# Patient Record
Sex: MALE | Race: OTHER | NOT HISPANIC OR LATINO
[De-identification: names, ages, dates, MRNs, and addresses within clinical notes are randomized per-mention and may not be internally consistent; named-entity substitution may affect disease eponyms.]

---

## 2017-05-15 ENCOUNTER — MEDICATION RENEWAL (OUTPATIENT)
Age: 62
End: 2017-05-15

## 2017-06-28 ENCOUNTER — MEDICATION RENEWAL (OUTPATIENT)
Age: 62
End: 2017-06-28

## 2017-06-29 ENCOUNTER — MEDICATION RENEWAL (OUTPATIENT)
Age: 62
End: 2017-06-29

## 2017-10-02 ENCOUNTER — APPOINTMENT (OUTPATIENT)
Age: 62
End: 2017-10-02
Payer: COMMERCIAL

## 2017-10-02 VITALS
TEMPERATURE: 97.7 F | RESPIRATION RATE: 16 BRPM | HEART RATE: 80 BPM | HEIGHT: 64 IN | SYSTOLIC BLOOD PRESSURE: 122 MMHG | DIASTOLIC BLOOD PRESSURE: 80 MMHG

## 2017-10-02 VITALS — WEIGHT: 173 LBS | BODY MASS INDEX: 29.7 KG/M2

## 2017-10-02 PROCEDURE — 99214 OFFICE O/P EST MOD 30 MIN: CPT

## 2018-03-09 ENCOUNTER — MEDICATION RENEWAL (OUTPATIENT)
Age: 63
End: 2018-03-09

## 2018-03-20 ENCOUNTER — MEDICATION RENEWAL (OUTPATIENT)
Age: 63
End: 2018-03-20

## 2018-04-04 ENCOUNTER — APPOINTMENT (OUTPATIENT)
Age: 63
End: 2018-04-04
Payer: COMMERCIAL

## 2018-04-04 VITALS
HEIGHT: 64 IN | SYSTOLIC BLOOD PRESSURE: 165 MMHG | DIASTOLIC BLOOD PRESSURE: 89 MMHG | WEIGHT: 168 LBS | BODY MASS INDEX: 28.68 KG/M2 | HEART RATE: 73 BPM | TEMPERATURE: 98.4 F | RESPIRATION RATE: 12 BRPM

## 2018-04-04 PROCEDURE — 99214 OFFICE O/P EST MOD 30 MIN: CPT

## 2018-08-13 ENCOUNTER — OTHER (OUTPATIENT)
Age: 63
End: 2018-08-13

## 2018-10-11 ENCOUNTER — APPOINTMENT (OUTPATIENT)
Dept: HEPATOLOGY | Facility: CLINIC | Age: 63
End: 2018-10-11
Payer: COMMERCIAL

## 2018-10-11 VITALS
DIASTOLIC BLOOD PRESSURE: 85 MMHG | SYSTOLIC BLOOD PRESSURE: 154 MMHG | TEMPERATURE: 97.7 F | BODY MASS INDEX: 28 KG/M2 | RESPIRATION RATE: 12 BRPM | HEIGHT: 64 IN | HEART RATE: 62 BPM | WEIGHT: 164 LBS

## 2018-10-11 PROCEDURE — 99214 OFFICE O/P EST MOD 30 MIN: CPT

## 2019-02-12 ENCOUNTER — APPOINTMENT (OUTPATIENT)
Dept: HEPATOLOGY | Facility: CLINIC | Age: 64
End: 2019-02-12
Payer: COMMERCIAL

## 2019-02-12 PROCEDURE — 91200 LIVER ELASTOGRAPHY: CPT

## 2019-04-26 ENCOUNTER — MEDICATION RENEWAL (OUTPATIENT)
Age: 64
End: 2019-04-26

## 2019-05-09 ENCOUNTER — APPOINTMENT (OUTPATIENT)
Dept: HEPATOLOGY | Facility: CLINIC | Age: 64
End: 2019-05-09
Payer: COMMERCIAL

## 2019-05-09 VITALS
BODY MASS INDEX: 27.83 KG/M2 | DIASTOLIC BLOOD PRESSURE: 87 MMHG | HEART RATE: 58 BPM | WEIGHT: 163 LBS | HEIGHT: 64 IN | RESPIRATION RATE: 12 BRPM | TEMPERATURE: 97.8 F | SYSTOLIC BLOOD PRESSURE: 149 MMHG

## 2019-05-09 LAB
AFP-TM SERPL-MCNC: 2.1 NG/ML
ALBUMIN SERPL ELPH-MCNC: 4 G/DL
ALP BLD-CCNC: 92 U/L
ALT SERPL-CCNC: 19 U/L
ANION GAP SERPL CALC-SCNC: 15 MMOL/L
AST SERPL-CCNC: 15 U/L
BASOPHILS # BLD AUTO: 0.04 K/UL
BASOPHILS NFR BLD AUTO: 0.5 %
BILIRUB SERPL-MCNC: 0.7 MG/DL
BUN SERPL-MCNC: 22 MG/DL
CALCIUM SERPL-MCNC: 9.3 MG/DL
CHLORIDE SERPL-SCNC: 103 MMOL/L
CO2 SERPL-SCNC: 22 MMOL/L
CREAT SERPL-MCNC: 1.31 MG/DL
EOSINOPHIL # BLD AUTO: 0.16 K/UL
EOSINOPHIL NFR BLD AUTO: 2.1 %
HCT VFR BLD CALC: 50.1 %
HGB BLD-MCNC: 16.2 G/DL
IMM GRANULOCYTES NFR BLD AUTO: 0.5 %
LYMPHOCYTES # BLD AUTO: 2.83 K/UL
LYMPHOCYTES NFR BLD AUTO: 37.1 %
MAN DIFF?: NORMAL
MCHC RBC-ENTMCNC: 30.3 PG
MCHC RBC-ENTMCNC: 32.3 GM/DL
MCV RBC AUTO: 93.6 FL
MONOCYTES # BLD AUTO: 0.84 K/UL
MONOCYTES NFR BLD AUTO: 11 %
NEUTROPHILS # BLD AUTO: 3.72 K/UL
NEUTROPHILS NFR BLD AUTO: 48.8 %
PLATELET # BLD AUTO: 300 K/UL
POTASSIUM SERPL-SCNC: 4.9 MMOL/L
PROT SERPL-MCNC: 6.9 G/DL
RBC # BLD: 5.35 M/UL
RBC # FLD: 13.3 %
SODIUM SERPL-SCNC: 140 MMOL/L
WBC # FLD AUTO: 7.63 K/UL

## 2019-05-09 PROCEDURE — 99214 OFFICE O/P EST MOD 30 MIN: CPT

## 2019-05-10 LAB
HBV CORE IGG+IGM SER QL: REACTIVE
HBV SURFACE AB SER QL: NONREACTIVE
HBV SURFACE AG SER QL: REACTIVE

## 2019-05-13 LAB
HBV DNA # SERPL NAA+PROBE: NOT DETECTED IU/ML
HEPB DNA PCR LOG: NOT DETECTED LOGIU/ML

## 2019-05-15 LAB
HBV E AB SER QL: POSITIVE
HBV E AG SER QL: NEGATIVE

## 2019-11-27 ENCOUNTER — APPOINTMENT (OUTPATIENT)
Dept: HEPATOLOGY | Facility: CLINIC | Age: 64
End: 2019-11-27
Payer: COMMERCIAL

## 2019-11-27 VITALS
HEIGHT: 64 IN | RESPIRATION RATE: 12 BRPM | HEART RATE: 80 BPM | WEIGHT: 166 LBS | DIASTOLIC BLOOD PRESSURE: 77 MMHG | SYSTOLIC BLOOD PRESSURE: 151 MMHG | BODY MASS INDEX: 28.34 KG/M2 | TEMPERATURE: 98.9 F

## 2019-11-27 PROCEDURE — 99214 OFFICE O/P EST MOD 30 MIN: CPT

## 2020-08-06 ENCOUNTER — APPOINTMENT (OUTPATIENT)
Dept: HEPATOLOGY | Facility: CLINIC | Age: 65
End: 2020-08-06

## 2020-08-21 ENCOUNTER — APPOINTMENT (OUTPATIENT)
Dept: HEPATOLOGY | Facility: CLINIC | Age: 65
End: 2020-08-21
Payer: COMMERCIAL

## 2020-08-21 VITALS
HEART RATE: 60 BPM | TEMPERATURE: 97.9 F | BODY MASS INDEX: 27.49 KG/M2 | DIASTOLIC BLOOD PRESSURE: 85 MMHG | SYSTOLIC BLOOD PRESSURE: 125 MMHG | RESPIRATION RATE: 12 BRPM | HEIGHT: 64 IN | WEIGHT: 161 LBS

## 2020-08-21 PROCEDURE — 91200 LIVER ELASTOGRAPHY: CPT

## 2020-08-21 PROCEDURE — 99214 OFFICE O/P EST MOD 30 MIN: CPT

## 2020-08-21 RX ORDER — ASPIRIN 81 MG/1
81 TABLET ORAL
Qty: 60 | Refills: 0 | Status: DISCONTINUED | COMMUNITY
Start: 2017-06-01 | End: 2020-08-21

## 2020-08-21 NOTE — HISTORY OF PRESENT ILLNESS
[de-identified] : Mr. NAHOMY FERRO is 65 year old male who presents for the follow up appointment with H/O hepatitis B. He had been on had Viread which was switched to Baraclude on April 12, 2012. He has no complaints, feels well. \par \par Fibroscan 08/21/20 S3 (288) and F0-1 (4.4) disease.\par February 12, 2019 with F0, S1 disease\par \par 2020 [facility] Labs AST 16, ALT 19, ALP 89, , AFP 2.8, Hepatitis B Ag Reactive and DNA not detected. US Ab done on 07/31 Normal  results with No focal lesions, No Ascites.   \par \par Blood tests in November 19, 2019 platelet count 235,000, creatinine 1.44, ALT 15, AST 12, total bilirubin 0.8, alpha-fetoprotein 2.6. Abdominal sonogram November 17, 2019 with fatty infiltration of the liver and no lesions noted.\par \par Blood tests March 12, 2019 CBC with platelets unremarkable, ALT 18, AST 14 alpha-fetoprotein 2.6. Abdominal sonogram April 6, 2019 with no lesions in the liver.\par \par Blood tests September 22, 2018 ALT 15, AST 13, total bilirubin 1.5, creatinine 1.11, platelet count 225,000, alpha-fetoprotein 2.5, HBV DNA not detected.\par \par Blood tests January 30, 2018 HBV DNA not detected, ALT 16, ALT 14, E antigen negative. Abdominal sonogram February 17, 2018 shows no lesions in the liver\par \par Blood tests April 30, 2016 ALT 18, AST 16, total bilirubin 1.4, creatinine 1.1, CBC with platelets unremarkable, HBV DNA not detected. Abdominal sonogram April 30, 2016 shows normal lesions in the liver.\par \par Sonogram October 2015 without lesions in the liver. Blood tests September 12, 2015 ALT 22, creatinine 1.29.  HBV DNA June 17, 2015 not detected\par \par An abdominal sonogram from September 13, 2013 to the stable hemangioma in the right lobe of the liver measuring 8 mm. Blood tests on September 13, 2013 show an ALT of 2.3, HBV DNA is less than 24, ALT 19, creatinine 1.26, hemoglobin 17.2.\par \par ALT from February 8, 2013 is 19 with an HBV DNA of less than 20 but detected.\par

## 2020-08-21 NOTE — ASSESSMENT
[FreeTextEntry1] : Mr. NAHOMY FERRO is 65 year old male with H/O Hepatitis B who appears to be doing well.  I have recommended we continue Baraclude.\par \par PLAN to F/U in 6 months with repeat laboratory tests and MRI as suggested by the US Ab results.\par Reviewed the Lab and FS results.\par \par Mr. FERRO was educated about the Fibroscan is a specialized ultrasound machine for your liver. It measures fibrosis (scarring) and steatosis (fatty change) in liver. It does not take the picture of the liver but measures the sheer wave velocity of the sound wave passing through the liver and then converts that into measurement. Hence the follow up for liver imaging is needed in the form of ultrasound or MRI to get a picture of the liver. \par  \par NAHOMY was educated about the fatty liver disease. Reviewed the spectrum of disease, the risk of disease progression to developing cirrhosis and the associated complications. Explained the patient may develop liver cancer without cirrhosis and therefore should be under the yearly surveillance with an abdominal ultrasound. Reviewed the added risk factors commonly seen in patients with diabetes or those who are overweight or vice versa, a precursor to the development of diabetes as it is a component of the metabolic syndrome. Taught back that the best treatment of fatty liver disease is diet and exercise. Discussed the present diet with the patient and recommended the avoidance of fatty foods and to follow a heart healthy diet. Also explained that weight loss may lead to an improvement in the overall underlying liver disease. Taught back the physiology of alcohol abstinence has a important contribution to liver health. \par  \par I discussed at length with the patient regarding hepatitis B. We spoke about the natural history, modes of transmission, diagnostic evaluation and treatment.  I have explained the risk of development of liver cancer and have recommended imaging every 6 months to screen for primary hepatocellular carcinoma. I have discussed at length regarding treatment. I have explained that treatment is likely to be lifelong. I have reviewed parameters for stopping treatment. I have explained that if there is a hepatitis B surface antigen seroconversion with the development of hepatitis B surface antibody, therapy may be discontinued. I have reviewed side effects of therapy.\par \par Encouraged to call back in the interim with any issues or concerns so that we can address and assist as required.

## 2020-08-21 NOTE — PHYSICAL EXAM
[Non-Tender] : non-tender [Cognitive Mini-Mental Status Normal?] : Cognitive Mini Mental Status Exam is normal [General Appearance - Alert] : alert [General Appearance - In No Acute Distress] : in no acute distress [Sclera] : the sclera and conjunctiva were normal [PERRL With Normal Accommodation] : pupils were equal in size, round, and reactive to light [Extraocular Movements] : extraocular movements were intact [Outer Ear] : the ears and nose were normal in appearance [Neck Appearance] : the appearance of the neck was normal [Neck Cervical Mass (___cm)] : no neck mass was observed [Jugular Venous Distention Increased] : there was no jugular-venous distention [Thyroid Diffuse Enlargement] : the thyroid was not enlarged [Thyroid Nodule] : there were no palpable thyroid nodules [Heart Sounds] : normal S1 and S2 [Auscultation Breath Sounds / Voice Sounds] : lungs were clear to auscultation bilaterally [Heart Rate And Rhythm] : heart rate was normal and rhythm regular [Bowel Sounds] : normal bowel sounds [Edema] : there was no peripheral edema [Abdomen Soft] : soft [Abdomen Mass (___ Cm)] : no abdominal mass palpated [Abdomen Tenderness] : non-tender [Abnormal Walk] : normal gait [No CVA Tenderness] : no ~M costovertebral angle tenderness [No Spinal Tenderness] : no spinal tenderness [Musculoskeletal - Swelling] : no joint swelling seen [Nail Clubbing] : no clubbing  or cyanosis of the fingernails [Motor Tone] : muscle strength and tone were normal [Skin Turgor] : normal skin turgor [Skin Color & Pigmentation] : normal skin color and pigmentation [] : no rash [Deep Tendon Reflexes (DTR)] : deep tendon reflexes were 2+ and symmetric [Sensation] : the sensory exam was normal to light touch and pinprick [No Focal Deficits] : no focal deficits [Oriented To Time, Place, And Person] : oriented to person, place, and time [Impaired Insight] : insight and judgment were intact [Affect] : the affect was normal [Scleral Icterus] : No Scleral Icterus [Spider Angioma] : No spider angioma(s) were observed [Hepatojugular Reflux] : patient did not have a sustained hepatojugular reflux [Abdominal Bruit] : no abdominal bruit [Abdominal  Ascites] : no ascites [Splenomegaly] : no splenomegaly [Asterixis] : no asterixis observed [Depression] : no depression [Palmar Erythema] : no Palmar Erythema [Jaundice] : No jaundice [Apical Impulse] : the apical impulse was normal [Supraclavicular Lymph Nodes Enlarged Bilaterally] : supraclavicular [Cervical Lymph Nodes Enlarged Posterior Bilaterally] : posterior cervical

## 2021-02-19 ENCOUNTER — APPOINTMENT (OUTPATIENT)
Dept: HEPATOLOGY | Facility: CLINIC | Age: 66
End: 2021-02-19

## 2021-03-05 ENCOUNTER — RESULT REVIEW (OUTPATIENT)
Age: 66
End: 2021-03-05

## 2021-03-05 ENCOUNTER — OUTPATIENT (OUTPATIENT)
Dept: OUTPATIENT SERVICES | Facility: HOSPITAL | Age: 66
LOS: 1 days | End: 2021-03-05
Payer: COMMERCIAL

## 2021-03-05 ENCOUNTER — APPOINTMENT (OUTPATIENT)
Dept: MRI IMAGING | Facility: CLINIC | Age: 66
End: 2021-03-05
Payer: COMMERCIAL

## 2021-03-05 DIAGNOSIS — B19.10 UNSPECIFIED VIRAL HEPATITIS B WITHOUT HEPATIC COMA: ICD-10-CM

## 2021-03-05 PROCEDURE — 74183 MRI ABD W/O CNTR FLWD CNTR: CPT

## 2021-03-05 PROCEDURE — 74183 MRI ABD W/O CNTR FLWD CNTR: CPT | Mod: 26

## 2021-03-05 PROCEDURE — A9585: CPT

## 2021-05-14 ENCOUNTER — APPOINTMENT (OUTPATIENT)
Dept: HEPATOLOGY | Facility: CLINIC | Age: 66
End: 2021-05-14
Payer: MEDICARE

## 2021-05-14 VITALS
HEIGHT: 64 IN | TEMPERATURE: 97.9 F | RESPIRATION RATE: 12 BRPM | HEART RATE: 60 BPM | WEIGHT: 163 LBS | SYSTOLIC BLOOD PRESSURE: 135 MMHG | BODY MASS INDEX: 27.83 KG/M2 | DIASTOLIC BLOOD PRESSURE: 84 MMHG

## 2021-05-14 PROCEDURE — 99215 OFFICE O/P EST HI 40 MIN: CPT

## 2021-05-14 PROCEDURE — 99072 ADDL SUPL MATRL&STAF TM PHE: CPT

## 2021-05-14 NOTE — HISTORY OF PRESENT ILLNESS
[de-identified] : Mr. NAHOMY FERRO is 65 year old male who presents for the follow up appointment with H/O hepatitis B. He had been on had Viread which was switched to Baraclude on April 12, 2012. He has no complaints, feels well. \par \par Fibroscan 08/21/20 S3 (288) and F0-1 (4.4) disease.\par February 12, 2019 with F0, S1 disease.\par \par Labs on 04/06/21 shows [facility]- CMP, PT/INR, CBC, and AFP 3.1. Hepatitis BV panel reported to be normal could not get the result send. \par MRI Ab on 03/05/21 shows Stable punctate previously described hemangioma right hepatic lobe. Small hemangioma lumbar spine.\par \par 2020 [facility] Labs AST 16, ALT 19, ALP 89, , AFP 2.8, Hepatitis B Ag Reactive and DNA not detected. US Ab done on 07/31 Normal results with No focal lesions, No Ascites.   \par \par Blood tests in November 19, 2019 platelet count 235,000, creatinine 1.44, ALT 15, AST 12, total bilirubin 0.8, alpha-fetoprotein 2.6. Abdominal sonogram November 17, 2019 with fatty infiltration of the liver and no lesions noted.\par \par Blood tests March 12, 2019 CBC with platelets unremarkable, ALT 18, AST 14 alpha-fetoprotein 2.6. Abdominal sonogram April 6, 2019 with no lesions in the liver.\par \par Blood tests September 22, 2018 ALT 15, AST 13, total bilirubin 1.5, creatinine 1.11, platelet count 225,000, alpha-fetoprotein 2.5, HBV DNA not detected.\par \par Blood tests January 30, 2018 HBV DNA not detected, ALT 16, ALT 14, and E antigen negative. Abdominal sonogram February 17, 2018 shows no lesions in the liver\par \par Blood tests April 30, 2016 ALT 18, AST 16, total bilirubin 1.4, creatinine 1.1, CBC with platelets unremarkable, HBV DNA not detected. Abdominal sonogram April 30, 2016 shows normal lesions in the liver.\par \par Sonogram October 2015 without lesions in the liver. Blood tests September 12, 2015 ALT 22, creatinine 1.29.  HBV DNA June 17, 2015 not detected\par \par Abdominal sonogram from September 13, 2013 to the stable hemangioma in the right lobe of the liver measuring 8 mm. Blood tests on September 13, 2013 show an ALT of 2.3, HBV DNA is less than 24, ALT 19, creatinine 1.26, hemoglobin 17.2.\par \par ALT from February 8, 2013 is 19 with an HBV DNA of less than 20 but detected.\par

## 2021-05-14 NOTE — ASSESSMENT
[FreeTextEntry1] : Mr. NAHOMY FERRO is 65 year old male with H/O hepatitis B. He had been on had Viread which was switched to Baraclude on April 12, 2012. \par \par # [facility] - Labs on 04/06/21 shows [facility]- CMP, PT/INR, CBC, and AFP 3.1. Hepatitis BV panel reported to be normal could not get the result send. He had been on had Viread which was switched to Baraclude on April 12, 2012. \par I discussed at length with the patient regarding hepatitis B. We spoke about the natural history, modes of transmission, diagnostic evaluation and treatment.  I have explained the risk of development of liver cancer and have recommended imaging every 6 months to screen for primary hepatocellular carcinoma. I have discussed at length regarding treatment. I have explained that treatment is likely to be lifelong. I have reviewed parameters for stopping treatment. I have explained that if there is a hepatitis B surface antigen seroconversion with the development of hepatitis B surface antibody, therapy may be discontinued. I have reviewed side effects of therapy.\par \par # NAFLD - Reviewed the Fibroscan 08/21/20 S3 (288) and F0-1 (4.4) disease.\par NAHOMY was educated about the fatty liver disease. Reviewed the spectrum of disease, the risk of disease progression to developing cirrhosis and the associated complications. Explained the patient may develop liver cancer without cirrhosis and therefore should be under the yearly surveillance with an abdominal ultrasound. Reviewed the added risk factors commonly seen in patients with diabetes or those who are overweight or vice versa, a precursor to the development of diabetes as it is a component of the metabolic syndrome. Taught back that the best treatment of fatty liver disease is diet and exercise. Discussed the present diet with the patient and recommended the avoidance of fatty foods and to follow a heart healthy diet. Also explained that weight loss may lead to an improvement in the overall underlying liver disease. Taught back the physiology of alcohol abstinence has a important contribution to liver health. \par  \par # Hemangiomas - MRI Ab on 03/05/21 shows Stable punctate previously described hemangioma right hepatic lobe. Small hemangioma lumbar spine. Educated about the benign nature of it and can monitor with US ab in 6 months.\par \par PLAN to F/U in 6 months with repeat laboratory tests, US Ab and FS with RPA.\par Encouraged to call back in the interim with any issues or concerns so that we can address and assist as required.

## 2021-05-14 NOTE — PHYSICAL EXAM
[Non-Tender] : non-tender [Cognitive Mini-Mental Status Normal?] : Cognitive Mini Mental Status Exam is normal [General Appearance - Alert] : alert [General Appearance - In No Acute Distress] : in no acute distress [Sclera] : the sclera and conjunctiva were normal [PERRL With Normal Accommodation] : pupils were equal in size, round, and reactive to light [Extraocular Movements] : extraocular movements were intact [Outer Ear] : the ears and nose were normal in appearance [Neck Appearance] : the appearance of the neck was normal [Neck Cervical Mass (___cm)] : no neck mass was observed [Jugular Venous Distention Increased] : there was no jugular-venous distention [Thyroid Diffuse Enlargement] : the thyroid was not enlarged [Thyroid Nodule] : there were no palpable thyroid nodules [Auscultation Breath Sounds / Voice Sounds] : lungs were clear to auscultation bilaterally [Apical Impulse] : the apical impulse was normal [Heart Rate And Rhythm] : heart rate was normal and rhythm regular [Heart Sounds] : normal S1 and S2 [Edema] : there was no peripheral edema [Bowel Sounds] : normal bowel sounds [Abdomen Soft] : soft [Abdomen Tenderness] : non-tender [Abdomen Mass (___ Cm)] : no abdominal mass palpated [Cervical Lymph Nodes Enlarged Posterior Bilaterally] : posterior cervical [Supraclavicular Lymph Nodes Enlarged Bilaterally] : supraclavicular [No CVA Tenderness] : no ~M costovertebral angle tenderness [No Spinal Tenderness] : no spinal tenderness [Abnormal Walk] : normal gait [Nail Clubbing] : no clubbing  or cyanosis of the fingernails [Musculoskeletal - Swelling] : no joint swelling seen [Motor Tone] : muscle strength and tone were normal [Skin Color & Pigmentation] : normal skin color and pigmentation [Skin Turgor] : normal skin turgor [] : no rash [Deep Tendon Reflexes (DTR)] : deep tendon reflexes were 2+ and symmetric [Sensation] : the sensory exam was normal to light touch and pinprick [No Focal Deficits] : no focal deficits [Oriented To Time, Place, And Person] : oriented to person, place, and time [Impaired Insight] : insight and judgment were intact [Affect] : the affect was normal [Scleral Icterus] : No Scleral Icterus [Hepatojugular Reflux] : patient did not have a sustained hepatojugular reflux [Spider Angioma] : No spider angioma(s) were observed [Abdominal Bruit] : no abdominal bruit [Abdominal  Ascites] : no ascites [Splenomegaly] : no splenomegaly [Asterixis] : no asterixis observed [Jaundice] : No jaundice [Palmar Erythema] : no Palmar Erythema [Depression] : no depression

## 2021-11-12 ENCOUNTER — APPOINTMENT (OUTPATIENT)
Dept: HEPATOLOGY | Facility: CLINIC | Age: 66
End: 2021-11-12

## 2022-02-18 ENCOUNTER — APPOINTMENT (OUTPATIENT)
Dept: HEPATOLOGY | Facility: CLINIC | Age: 67
End: 2022-02-18
Payer: MEDICARE

## 2022-02-18 VITALS
HEIGHT: 64 IN | TEMPERATURE: 97.8 F | OXYGEN SATURATION: 95 % | WEIGHT: 173 LBS | BODY MASS INDEX: 29.53 KG/M2 | SYSTOLIC BLOOD PRESSURE: 154 MMHG | RESPIRATION RATE: 13 BRPM | DIASTOLIC BLOOD PRESSURE: 83 MMHG | HEART RATE: 59 BPM

## 2022-02-18 DIAGNOSIS — R73.03 PREDIABETES.: ICD-10-CM

## 2022-02-18 DIAGNOSIS — Z86.79 PERSONAL HISTORY OF OTHER DISEASES OF THE CIRCULATORY SYSTEM: ICD-10-CM

## 2022-02-18 PROCEDURE — 99215 OFFICE O/P EST HI 40 MIN: CPT

## 2022-02-18 RX ORDER — ATORVASTATIN CALCIUM 20 MG/1
20 TABLET, FILM COATED ORAL DAILY
Refills: 0 | Status: ACTIVE | COMMUNITY
Start: 2020-08-21

## 2022-02-18 NOTE — HISTORY OF PRESENT ILLNESS
[de-identified] : \par \par  [de-identified] : Mr. NAHOMY FERRO is 66 year old male who presents for the follow up appointment with H/O hepatitis BV on Entecavir 0.5 mg daily. He has no complaints, feels well. \par \par Pertinent H/O had been on had Viread which was switched to Baraclude on April 12, 2012. \par \par Fibroscan 08/21/20 S3 (288) and F0-1 (4.4) disease.\par February 12, 2019 with F0, S1 disease.\par \par HB Serology - Reactive to HBsAg, anti-HBc & anti-HBe; Non-Reactive HBeAg & anti-HBs.\par \par US Ab on 02/15/2022 shows echogenic liver shows steatosis vs LD, No focal lesions, No Ascites and no biliary ductal dilatation. Labs on 01/14/2022 [facility]- AFP 3.5, CBC, CMP except Cr 1.26 with low eGFR 59 and HBV DNA Not detected<10.\par \par Labs on 04/06/21 shows [facility]- CMP, PT/INR, CBC, and AFP 3.1. Hepatitis BV panel reported to be normal could not get the result send. \par MRI Ab on 03/05/21 shows Stable punctate previously described hemangioma right hepatic lobe. Small hemangioma lumbar spine.\par \par 2020 [facility] Labs AST 16, ALT 19, ALP 89, , AFP 2.8, Hepatitis B Ag Reactive and DNA not detected. US Ab done on 07/31 Normal results with No focal lesions, No Ascites.   \par \par Blood tests in November 19, 2019 platelet count 235,000, creatinine 1.44, ALT 15, AST 12, total bilirubin 0.8, alpha-fetoprotein 2.6. Abdominal sonogram November 17, 2019 with fatty infiltration of the liver and no lesions noted.\par \par Blood tests March 12, 2019 CBC with platelets unremarkable, ALT 18, AST 14 alpha-fetoprotein 2.6. Abdominal sonogram April 6, 2019 with no lesions in the liver.\par \par Blood tests September 22, 2018 ALT 15, AST 13, total bilirubin 1.5, creatinine 1.11, platelet count 225,000, alpha-fetoprotein 2.5, HBV DNA not detected.\par \par Blood tests January 30, 2018 HBV DNA not detected, ALT 16, ALT 14, and E antigen negative. Abdominal sonogram February 17, 2018 shows no lesions in the liver\par \par Blood tests April 30, 2016 ALT 18, AST 16, total bilirubin 1.4, creatinine 1.1, CBC with platelets unremarkable, HBV DNA not detected. Abdominal sonogram April 30, 2016 shows normal lesions in the liver.\par \par Sonogram October 2015 without lesions in the liver. Blood tests September 12, 2015 ALT 22, creatinine 1.29.  HBV DNA June 17, 2015 not detected\par \par Abdominal sonogram from September 13, 2013 to the stable hemangioma in the right lobe of the liver measuring 8 mm. Blood tests on September 13, 2013 show an ALT of 2.3, HBV DNA is less than 24, ALT 19, creatinine 1.26, hemoglobin 17.2.\par \par ALT from February 8, 2013 is 19 with an HBV DNA of less than 20 but detected.\par

## 2022-02-18 NOTE — ASSESSMENT
[FreeTextEntry1] : Mr. NAHOMY FERRO is 66 year old male with H/O hepatitis BV on Entecavir 0.5 mg daily. \par \par # [facility] - Was on Viread which was switched to Baraclude on April 12, 2012. HB Serology - Reactive to HBsAg, anti-HBc & anti-HBe; Non-Reactive HBeAg & anti-HBs. Labs on 01/14/2022 [facility]- AFP 3.5, CBC, CMP except Cr 1.26 with low eGFR 59 and HBV DNA Not detected<10. \par >HCC screening - US Ab on 02/15/2022 shows echogenic liver shows No focal lesions, Labs on 01/14/2022 [facility]- AFP 3.5.\par >Fibrosis- Fibroscan 08/21/20 F0-1 (4.4) disease, advised to repeat the FS.\par \par NAHOMY was educated about the hepatitis B. We spoke about the natural history, modes of transmission, diagnostic evaluation and treatment options. I have explained the risk of development of liver cancer and have recommended imaging every 6 months to screen for primary hepatocellular carcinoma.  I have discussed at length regarding treatment. I have recommended adherence to DAAs. I have explained that this is likely to be lifelong. I have reviewed parameters for stopping treatment. I have explained that if there is a hepatitis B surface antigen seroconversion with the development of hepatitis B surface antibody, therapy may be discontinued. I have reviewed side effects of therapy.\par \par # NAFLD - Reviewed the US Ab on 02/15/2022 shows echogenic liver shows steatosis and Fibroscan 08/21/20 S3 (288) disease. Will repeat MRE as ordered.\par NAHOMY was educated about the fatty liver disease. Reviewed the spectrum of disease, the risk of disease progression to developing cirrhosis and the associated complications. Explained the patient may develop liver cancer without cirrhosis and therefore should be under the yearly surveillance with an abdominal ultrasound. Reviewed the added risk factors commonly seen in patients with diabetes or those who are overweight or vice versa, a precursor to the development of diabetes as it is a component of the metabolic syndrome. Taught back that the best treatment of fatty liver disease is diet and exercise. Discussed the present diet with the patient and recommended the avoidance of fatty foods and to follow a heart healthy diet. Also explained that weight loss may lead to an improvement in the overall underlying liver disease. Taught back the physiology of alcohol abstinence has an important contribution to liver health. \par \par # Immunity - Will check for HAV antibodies with next labs. He completed the 3 dose COVID Moderna vaccine with no adverse effects. \par # EGD/COL - Reports to be up to date and Normal. \par \par # Hemangiomas - MRI Ab on 03/05/21 shows Stable punctate previously described hemangioma right hepatic lobe. Small hemangioma lumbar spine. Educated about the benign nature of it and can monitor with US ab in 6 months.\par \par PLAN to F/U in 6 months with repeat laboratory tests, US Ab and MRE prior to RPA.\par Encouraged to call back in the interim with any issues or concerns so that we can address and assist as required.\par

## 2022-02-23 ENCOUNTER — NON-APPOINTMENT (OUTPATIENT)
Age: 67
End: 2022-02-23

## 2022-03-09 ENCOUNTER — NON-APPOINTMENT (OUTPATIENT)
Age: 67
End: 2022-03-09

## 2022-03-11 ENCOUNTER — NON-APPOINTMENT (OUTPATIENT)
Age: 67
End: 2022-03-11

## 2022-03-14 ENCOUNTER — NON-APPOINTMENT (OUTPATIENT)
Age: 67
End: 2022-03-14

## 2022-05-27 RX ORDER — TENOFOVIR ALAFENAMIDE 25 MG/1
25 TABLET ORAL
Qty: 90 | Refills: 3 | Status: DISCONTINUED | COMMUNITY
Start: 2022-03-10 | End: 2022-05-27

## 2022-09-16 ENCOUNTER — OUTPATIENT (OUTPATIENT)
Dept: OUTPATIENT SERVICES | Facility: HOSPITAL | Age: 67
LOS: 1 days | End: 2022-09-16
Payer: COMMERCIAL

## 2022-09-16 ENCOUNTER — APPOINTMENT (OUTPATIENT)
Dept: MRI IMAGING | Facility: IMAGING CENTER | Age: 67
End: 2022-09-16

## 2022-09-16 ENCOUNTER — RESULT REVIEW (OUTPATIENT)
Age: 67
End: 2022-09-16

## 2022-09-16 DIAGNOSIS — B18.1 CHRONIC VIRAL HEPATITIS B WITHOUT DELTA-AGENT: ICD-10-CM

## 2022-09-16 PROCEDURE — A9585: CPT

## 2022-09-16 PROCEDURE — 74183 MRI ABD W/O CNTR FLWD CNTR: CPT | Mod: 26

## 2022-09-16 PROCEDURE — 76391 MR ELASTOGRAPHY: CPT | Mod: 26

## 2022-09-16 PROCEDURE — 74183 MRI ABD W/O CNTR FLWD CNTR: CPT

## 2022-09-16 PROCEDURE — 76391 MR ELASTOGRAPHY: CPT

## 2023-01-04 ENCOUNTER — APPOINTMENT (OUTPATIENT)
Dept: HEPATOLOGY | Facility: CLINIC | Age: 68
End: 2023-01-04
Payer: MEDICARE

## 2023-01-04 VITALS
OXYGEN SATURATION: 95 % | SYSTOLIC BLOOD PRESSURE: 117 MMHG | TEMPERATURE: 97.8 F | BODY MASS INDEX: 30.56 KG/M2 | DIASTOLIC BLOOD PRESSURE: 74 MMHG | RESPIRATION RATE: 14 BRPM | WEIGHT: 179 LBS | HEIGHT: 64 IN | HEART RATE: 72 BPM

## 2023-01-04 PROCEDURE — 99215 OFFICE O/P EST HI 40 MIN: CPT

## 2023-01-04 NOTE — ASSESSMENT
[FreeTextEntry1] : Mr. NAHOMY FERRO is 66 year old male with H/O hepatitis BV on Entecavir 0.5 mg daily. \par \par MRE on 09/16/2022: Mild Hepatic steatosis with 8 % fat fraction, Normal Hepatic stiffness: 2.0 kPa \par MRI on 09/16/2022: Normal morphology of the liver without focal lesion. A few scattered vertebral body hemangiomas. A previously noted right hepatic lobe hemangioma is no longer identified. \par Labs on 08/05/2022: [facility]DL- PLT, WBC, Hb, INR, AFP 2.4, CMP, CARLINE <1:80, ND HBV. On 07/06/2022: CBC, Vit B12/D, \par \par # [facility] - Was on Viread which was switched to Baraclude on April 12, 2012. \par > No Viremia - HBV Panel: Reactive to anti-HBe, HBsAg and anti-HBc; Non-Reactive HBeAg & anti-HBs.\par > HCC screening - MRI shows No focal lesions, with WDL- AFP.\par > Fibrosis- Normal hepatic stiffness reviewed, advised to repeat the FS.\par \par NAHOMY was educated about the hepatitis B. We spoke about the natural history, modes of transmission, diagnostic evaluation and treatment options. I have explained the risk of development of liver cancer and have recommended imaging every 6 months to screen for primary hepatocellular carcinoma.  I have discussed at length regarding treatment. I have recommended adherence to DAAs. I have explained that this is likely to be lifelong. I have reviewed parameters for stopping treatment. I have explained that if there is a hepatitis B surface antigen seroconversion with the development of hepatitis B surface antibody, therapy may be discontinued. I have reviewed side effects of therapy.\par \par # NAFLD - Reviewed the MRE shows mild steatosis and Fibroscan 08/21/20 S3 (288) disease. WDL- Lipids, T4 1.2, TSH 4.50, A1C 6.7%.\par NAHOMY was educated about the fatty liver disease. Reviewed the spectrum of disease, the risk of disease progression to developing cirrhosis and the associated complications. Explained the patient may develop liver cancer without cirrhosis and therefore should be under the yearly surveillance with an abdominal ultrasound. Reviewed the added risk factors commonly seen in patients with diabetes or those who are overweight or vice versa, a precursor to the development of diabetes as it is a component of the metabolic syndrome. Taught back that the best treatment of fatty liver disease is diet and exercise. Discussed the present diet with the patient and recommended the avoidance of fatty foods and to follow a heart healthy diet. Also explained that weight loss may lead to an improvement in the overall underlying liver disease. Taught back the physiology of alcohol abstinence has an important contribution to liver health. \par \par # Immunity - Will check for HAV antibodies with next labs. He completed the 3 dose COVID Moderna vaccine with no adverse effects. \par # EGD/COL - Reports to be up to date and Normal. \par \par # Hemangiomas – Not seen in the recent MRI c/t MRI Ab on 03/05/21 shows Stable punctate previously described hemangioma right hepatic lobe. \par Small hemangioma lumbar spine. Educated about the benign nature of it and do not need any monitoring.\par \par PLAN to F/U in 6 months with repeat laboratory tests, US Ab and MRE prior to RPA.\par Encouraged to call back in the interim with any issues or concerns so that we can address and assist as required.\par

## 2023-01-04 NOTE — HISTORY OF PRESENT ILLNESS
[de-identified] : \par \par  [FreeTextEntry1] : Mr. NAHOMY FERRO is 67 year old male who presents for the follow up appointment with H/O hepatitis BV on Entecavir 0.5 mg daily. He has no complaints, feels well. \par \par Pertinent H/O had been on had Viread which was switched to Baraclude on 04/12/2012 for persistent Low viremia on the drug. Vemlidy was not approved by his insurance.\par \par MRE on 09/16/2022: Mild Hepatic steatosis with 8 % fat fraction, Normal Hepatic stiffness: 2.0 kPa \par Fibroscan 08/21/20 S3 (288) and F0-1 (4.4) disease.\par February 12, 2019 with F0, S1 disease.\par *HBV Panel: Reactive to anti-HBe, HBsAg and anti-HBc; Non-Reactive HBeAg & anti-HBs.\par \par MRI on 09/16/2022: Normal morphology of the liver without focal lesion. A few scattered vertebral body hemangiomas. A previously noted right hepatic lobe hemangioma is no longer identified. \par \par Labs on 08/05/2022: WDL- PLT, WBC, Hb, INR, AFP 2.4, CMP, CARLINE <1:80, ND HBV. On 07/06/2022: CBC, Vit B12/D, Lipids, T4 1.2, TSH 4.50, A1C 6.7%.\par  \par US Ab on 02/15/2022 shows echogenic liver shows steatosis vs LD. \par Labs on 01/14/2022 WDL- AFP 3.5, CBC, CMP except Cr 1.26 with low eGFR 59 and HBV DNA Not detected<10.\par \par MRI Ab on 03/05/21 shows Stable punctate previously described hemangioma right hepatic lobe. Small hemangioma lumbar spine.\par \par Abdominal sonogram November 17, 2019 with fatty infiltration of the liver and no lesions noted.\par Blood tests September 12, 2015 ALT 22, creatinine 1.29.  HBV DNA June 17, 2015 not detected\par \par September 13, 2013: Abdominal sonogram from to the stable hemangioma in the right lobe of the liver measuring 8 mm. Blood tests show an ALT of 2.3, HBV DNA is less than 24, ALT 19, creatinine 1.26, hemoglobin 17.2.\par ALT from February 8, 2013 is 19 with an HBV DNA of less than 20 but detected.\par

## 2023-07-12 ENCOUNTER — APPOINTMENT (OUTPATIENT)
Dept: HEPATOLOGY | Facility: CLINIC | Age: 68
End: 2023-07-12

## 2023-08-09 ENCOUNTER — APPOINTMENT (OUTPATIENT)
Dept: HEPATOLOGY | Facility: CLINIC | Age: 68
End: 2023-08-09

## 2023-12-15 ENCOUNTER — NON-APPOINTMENT (OUTPATIENT)
Age: 68
End: 2023-12-15

## 2024-01-03 ENCOUNTER — APPOINTMENT (OUTPATIENT)
Dept: HEPATOLOGY | Facility: CLINIC | Age: 69
End: 2024-01-03

## 2024-01-08 ENCOUNTER — APPOINTMENT (OUTPATIENT)
Dept: HEPATOLOGY | Facility: CLINIC | Age: 69
End: 2024-01-08
Payer: MEDICARE

## 2024-01-08 VITALS
TEMPERATURE: 96.7 F | BODY MASS INDEX: 28.68 KG/M2 | OXYGEN SATURATION: 98 % | HEART RATE: 74 BPM | SYSTOLIC BLOOD PRESSURE: 154 MMHG | DIASTOLIC BLOOD PRESSURE: 80 MMHG | HEIGHT: 64 IN | WEIGHT: 168 LBS | RESPIRATION RATE: 14 BRPM

## 2024-01-08 DIAGNOSIS — E78.5 HYPERLIPIDEMIA, UNSPECIFIED: ICD-10-CM

## 2024-01-08 DIAGNOSIS — K76.0 FATTY (CHANGE OF) LIVER, NOT ELSEWHERE CLASSIFIED: ICD-10-CM

## 2024-01-08 DIAGNOSIS — E66.9 OBESITY, UNSPECIFIED: ICD-10-CM

## 2024-01-08 DIAGNOSIS — D18.03 HEMANGIOMA OF INTRA-ABDOMINAL STRUCTURES: ICD-10-CM

## 2024-01-08 DIAGNOSIS — E74.39 OTHER DISORDERS OF INTESTINAL CARBOHYDRATE ABSORPTION: ICD-10-CM

## 2024-01-08 PROCEDURE — 99214 OFFICE O/P EST MOD 30 MIN: CPT

## 2024-01-08 RX ORDER — ENTECAVIR 0.5 MG/1
0.5 TABLET, FILM COATED ORAL
Qty: 90 | Refills: 3 | Status: ACTIVE | COMMUNITY
Start: 2022-05-27 | End: 1900-01-01

## 2024-01-08 NOTE — HISTORY OF PRESENT ILLNESS
[de-identified] : \par  \par   [FreeTextEntry1] : - 1/8/23: first visit with me. Last seen by KOSTA Morelos on 1/4/23.  Exam: 179 lbs, BMI 30.7 - gained 13 lbs since 2019. Labs (scanned) 1/2/24: CBD normal, , CMP normal with creatinine 1.1 mg/dL, LFTs normal with ALT 12. HBV PCR negative. AFP 2.9 ng/mL normal  Mr. NAHOMY FERRO is 67 year old male who presents for the follow up appointment with H/O hepatitis BV on Entecavir 0.5 mg daily. He has no complaints, feels well.   Pertinent H/O had been on had Viread which was switched to Baraclude on 04/12/2012 for persistent Low viremia on the drug. Vemlidy was not approved by his insurance.  Workup: US abdomen 12/30/23: mild hepatic steatosis, liver otherwise normal. GB normal. No ascites.  MRE on 09/16/2022: Mild Hepatic steatosis with 8 % fat fraction, Normal Hepatic stiffness: 2.0 kPa  Fibroscan 08/21/20 S3 (288) and F0-1 (4.4) disease. February 12, 2019 with F0, S1 disease. *HBV Panel: Reactive to anti-HBe, HBsAg and anti-HBc; Non-Reactive HBeAg & anti-HBs.  MRI on 09/16/2022: Normal morphology of the liver without focal lesion. A few scattered vertebral body hemangiomas. A previously noted right hepatic lobe hemangioma is no longer identified.   Labs on 08/05/2022: WDL- PLT, WBC, Hb, INR, AFP 2.4, CMP, CARLINE <1:80, ND HBV. On 07/06/2022: CBC, Vit B12/D, Lipids, T4 1.2, TSH 4.50, A1C 6.7%.   US Ab on 02/15/2022 shows echogenic liver shows steatosis vs LD.  Labs on 01/14/2022 WDL- AFP 3.5, CBC, CMP except Cr 1.26 with low eGFR 59 and HBV DNA Not detected<10.  MRI Ab on 03/05/21 shows Stable punctate previously described hemangioma right hepatic lobe. Small hemangioma lumbar spine.  Abdominal sonogram November 17, 2019 with fatty infiltration of the liver and no lesions noted. Blood tests September 12, 2015 ALT 22, creatinine 1.29.  HBV DNA June 17, 2015 not detected  September 13, 2013: Abdominal sonogram from to the stable hemangioma in the right lobe of the liver measuring 8 mm. Blood tests show an ALT of 2.3, HBV DNA is less than 24, ALT 19, creatinine 1.26, hemoglobin 17.2. ALT from February 8, 2013 is 19 with an HBV DNA of less than 20 but detected.

## 2024-01-08 NOTE — ASSESSMENT
[FreeTextEntry1] : Mr. NAHOMY FERRO is 68-year-old man with chronic hepatitis B, obesity and NAFLD. 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  - hepatitis B, HBeAb+, HBeAb-; on entecavir 0.5 mg daily since 2012, was on Viread before that - HBV PCR negative - ALT normal in 1/2024 - MAFLD: mild steatosis on MRI 2022 and US 1/2024. Likely simple MAFLD since he has not developed fibrosis. - no fibrosis as per MRE on 09/16/2022: Mild Hepatic steatosis with 8 % fat fraction, Normal Hepatic stiffness: 2.0 kPa. Normal morphology of the liver without focal lesion. A few scattered vertebral body hemangiomas. A previously noted right hepatic lobe hemangioma is no longer identified   - in past HbA1c in range of glucose intolerance, and elevated triglycerides - HCC screening: AFP normal. US abdomen 12/2023 negative. Liver mildly steatotic, no liver lesion seen.  - immune to hepatitis A (BW 1/2024). He completed the 3 dose COVID Moderna vaccine with no adverse effects.  # Colon cancer screening: last colonsocopy ~2017, reportedly normal.  # Hemangiomas - Not seen in the recent MRI c/t MRI Ab on 03/05/21 shows Stable punctate previously described hemangioma right hepatic lobe.  Small hemangioma lumbar spine. Educated about the benign nature of it and do not need any monitoring.  PLAN: - return in 6 months after bloodwork and US abdomen - continue entecavir 0.5 mg/day

## 2024-06-19 DIAGNOSIS — B18.1 CHRONIC VIRAL HEPATITIS B W/OUT DELTA-AGENT: ICD-10-CM

## 2024-07-07 ENCOUNTER — NON-APPOINTMENT (OUTPATIENT)
Age: 69
End: 2024-07-07

## 2024-08-05 ENCOUNTER — APPOINTMENT (OUTPATIENT)
Dept: HEPATOLOGY | Facility: CLINIC | Age: 69
End: 2024-08-05

## 2024-08-05 PROCEDURE — G2211 COMPLEX E/M VISIT ADD ON: CPT

## 2024-08-05 PROCEDURE — 99214 OFFICE O/P EST MOD 30 MIN: CPT

## 2024-08-05 NOTE — ASSESSMENT
[FreeTextEntry1] : Mr. NAHOMY FERRO is 69-year-old man with chronic hepatitis B, obesity and NAFLD. 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  - hepatitis B, HBeAb+, HBeAb-; on entecavir 0.5 mg daily since 2012, was on Viread before that - HBV PCR negative - ALT normal, most recently in 6/2024 - MAFLD: mild steatosis on MRI 2022, US 1/2024, US 6/29/2024. Likely simple MAFLD since he has not developed fibrosis. - no fibrosis found on several fibroscans since 2019 and then MRE on 09/16/2022: Mild Hepatic steatosis with 8 % fat fraction, Normal Hepatic stiffness: 2.0 kPa. Normal morphology of the liver without focal lesion. A few scattered vertebral body hemangiomas. A previously noted right hepatic lobe hemangioma is no longer identified   - obesity, BMI 30. in 8/2024   - glucose intolerance, HbA1c 6.6% in 6/2024,   - elevated triglycerides - HCC screening: AFP normal. US abdomen 6/29/24 negative. Liver mildly steatotic, no liver lesion seen.  - immune to hepatitis A (BW 1/2024). He completed the 3 dose COVID Moderna vaccine with no adverse effects.  # Colon cancer screening: last colonoscopy ~2017, reportedly normal.  # Hemangiomas on MRI in 2021, not seen on MR elastography 9/2022. Hemangioma lumbar spine.  PLAN: - return in 6 months after bloodwork and US abdomen - continue entecavir 0.5 mg/day - weight loss: agree with swimming, would add daily biking or strenuous walking, agree with trial of Weight watchers/reduced caloric intake

## 2024-08-05 NOTE — HISTORY OF PRESENT ILLNESS
[de-identified] : \par  \par   [FreeTextEntry1] : - 8/05/24: had bloodwork and US abdomen. Gained 5 lbs, states he eats all day. Started swimming and going to the sauna. Exam: 170 lbs, height 5'3'' on my exam - shrank 1 inch since young. Labs 6/20/24: Abnormal: HbA1c 6.6%, glc 102. Normal: HBV PCR, LFTs, INR, AFP 2.6  - 1/8/23: first visit with me. Last seen by KOSTA Morelos on 1/4/23.  Exam: 179 lbs, BMI 30.7 - gained 13 lbs since 2019. Labs (scanned) 1/2/24: CBD normal, , CMP normal with creatinine 1.1 mg/dL, LFTs normal with ALT 12. HBV PCR negative. AFP 2.9 ng/mL normal  Mr. NAHOMY FERRO is 67 year old male who presents for the follow up appointment with H/O hepatitis BV on Entecavir 0.5 mg daily. He has no complaints, feels well.   Pertinent H/O had been on had Viread which was switched to Baraclude on 04/12/2012 for persistent Low viremia on the drug. Vemlidy was not approved by his insurance.  Workup: US abdomen 6/29/24 (scanned): fatty liver, otherwise normal. US abdomen 12/30/23 (scanned): mild hepatic steatosis, liver otherwise normal. GB normal. No ascites.  MR elastography wwo 09/16/2022: normal liver, spleeen, gallbladder. Mild Hepatic steatosis with 8 % fat fraction, Normal Hepatic stiffness: 2.0 kPa  Fibroscan 08/21/20 S3 (288) and F0-1 (4.4) fibrosis. Fibroscan 2/2019: F0, S1. No fibrosis; steatosis. US Ab on 02/15/2022 shows echogenic liver shows steatosis vs LD.  Labs on 01/14/2022 WDL- AFP 3.5, CBC, CMP except Cr 1.26 with low eGFR 59 and HBV DNA Not detected<10. MRI Ab on 03/05/21 shows Stable punctate previously described hemangioma right hepatic lobe. Small hemangioma lumbar spine. Abdominal sonogram November 17, 2019 with fatty infiltration of the liver and no lesions noted. September 13, 2013: Abdominal sonogram from to the stable hemangioma in the right lobe of the liver measuring 8 mm.

## 2024-11-25 ENCOUNTER — RX RENEWAL (OUTPATIENT)
Age: 69
End: 2024-11-25

## 2025-03-03 ENCOUNTER — APPOINTMENT (OUTPATIENT)
Dept: HEPATOLOGY | Facility: CLINIC | Age: 70
End: 2025-03-03
Payer: MEDICARE

## 2025-03-03 VITALS
SYSTOLIC BLOOD PRESSURE: 158 MMHG | RESPIRATION RATE: 16 BRPM | TEMPERATURE: 97.2 F | HEIGHT: 63 IN | BODY MASS INDEX: 28.7 KG/M2 | OXYGEN SATURATION: 98 % | DIASTOLIC BLOOD PRESSURE: 81 MMHG | WEIGHT: 162 LBS | HEART RATE: 54 BPM

## 2025-03-03 DIAGNOSIS — K76.0 FATTY (CHANGE OF) LIVER, NOT ELSEWHERE CLASSIFIED: ICD-10-CM

## 2025-03-03 DIAGNOSIS — B18.1 CHRONIC VIRAL HEPATITIS B W/OUT DELTA-AGENT: ICD-10-CM

## 2025-03-03 DIAGNOSIS — E74.39 OTHER DISORDERS OF INTESTINAL CARBOHYDRATE ABSORPTION: ICD-10-CM

## 2025-03-03 DIAGNOSIS — E66.811 OBESITY, CLASS 1: ICD-10-CM

## 2025-03-03 PROCEDURE — 99214 OFFICE O/P EST MOD 30 MIN: CPT

## 2025-03-03 PROCEDURE — G2211 COMPLEX E/M VISIT ADD ON: CPT

## 2025-03-03 RX ORDER — CLOPIDOGREL BISULFATE 75 MG/1
75 TABLET, FILM COATED ORAL
Qty: 90 | Refills: 0 | Status: ACTIVE | COMMUNITY
Start: 2025-02-06

## 2025-09-02 ENCOUNTER — APPOINTMENT (OUTPATIENT)
Dept: HEPATOLOGY | Facility: CLINIC | Age: 70
End: 2025-09-02

## 2025-09-08 ENCOUNTER — APPOINTMENT (OUTPATIENT)
Dept: SPINE | Facility: CLINIC | Age: 70
End: 2025-09-08